# Patient Record
Sex: MALE | Employment: FULL TIME | URBAN - METROPOLITAN AREA
[De-identification: names, ages, dates, MRNs, and addresses within clinical notes are randomized per-mention and may not be internally consistent; named-entity substitution may affect disease eponyms.]

---

## 2018-09-24 ENCOUNTER — PRE VISIT (OUTPATIENT)
Dept: UROLOGY | Facility: CLINIC | Age: 38
End: 2018-09-24

## 2018-09-24 NOTE — TELEPHONE ENCOUNTER
MEDICAL RECORDS REQUEST   [facility]  Urology Clinic  [address] [place] [address]  PHONE: [phone]  Fax: [phone]        FUTURE VISIT INFORMATION                                                   Shahab Whatley, : 1980 scheduled for future visit at [facility]    APPOINTMENT INFORMATION:    Date: 2018    Provider:  Jama Goins    Reason for Visit/Diagnosis: Consult for Vasectomy    REFERRAL INFORMATION:    Referring provider:  self    Specialty: self    Referring providers clinic:  self    Clinic contact number:  self    RECORDS REQUESTED FOR VISIT                                                     NOTES  STATUS/DETAILS   OFFICE NOTE from referring provider  no   OFFICE NOTE from other specialist  no   DISCHARGE SUMMARY from hospital  no   DISCHARGE REPORT from the ER  no   OPERATIVE REPORT  no   MEDICATION LIST  no       PRE-VISIT CHECKLIST      Record collection complete Yes   Appointment appropriately scheduled           (right time/right provider) Yes   MyChart activation If no, please explain in process   Questionnaire complete If no, please explain in process     Completed by: Deisy Barron

## 2018-10-31 ENCOUNTER — PRE VISIT (OUTPATIENT)
Dept: UROLOGY | Facility: CLINIC | Age: 38
End: 2018-10-31

## 2018-10-31 NOTE — TELEPHONE ENCOUNTER
Patient coming in for vasectomy consult with Dr. Goins. Patient chart reviewed, no need for call, all records available and ready for appointment.

## 2018-11-08 ENCOUNTER — OFFICE VISIT (OUTPATIENT)
Dept: UROLOGY | Facility: CLINIC | Age: 38
End: 2018-11-08
Payer: COMMERCIAL

## 2018-11-08 VITALS
BODY MASS INDEX: 29.96 KG/M2 | HEART RATE: 50 BPM | DIASTOLIC BLOOD PRESSURE: 79 MMHG | HEIGHT: 71 IN | SYSTOLIC BLOOD PRESSURE: 122 MMHG | WEIGHT: 214 LBS

## 2018-11-08 DIAGNOSIS — Z30.09 ENCOUNTER FOR VASECTOMY COUNSELING: Primary | ICD-10-CM

## 2018-11-08 ASSESSMENT — PAIN SCALES - GENERAL: PAINLEVEL: NO PAIN (0)

## 2018-11-08 NOTE — PROGRESS NOTES
"CC: Desires sterilization, consult for vasectomy.    HPI: Shahab Whatley is a 38 year old male with 3 children, and he is intersted in getting a vasectomy for sterilization.      No voiding problems.  No history of  trauma.  No hematuria  Normal sexual function.  No history of bleeding problems.    On evidence of blood per urethra 13 years ago.    PMH:   No chronic medical problems   No history of surgery.     FAMILY HX:   History reviewed. No pertinent family history.   No history of coagulopathies, no  malignancies.     ALLERGIES:    No Known Allergies    MEDS:   No prescription medications     SOCIAL HISTORY:  Social History   Substance Use Topics     Smoking status: Never Smoker     Smokeless tobacco: Never Used     Alcohol use Not on file        REVIEW OF SYMPTOMS:   Denies testicular pain, ED, ejaculatory problems, rashes in the groin, easy bruising or bleeding.   No constitutional, eye, ENT, heart, lung, GI, musculoskeletal, skin, neurologic, psychiatric, endocrine or hematologic complaints.   Answers for HPI/ROS submitted by the patient on 11/8/2018   General Symptoms: No  Skin Symptoms: No  HENT Symptoms: No  EYE SYMPTOMS: No  HEART SYMPTOMS: No  LUNG SYMPTOMS: No  INTESTINAL SYMPTOMS: No  URINARY SYMPTOMS: No  REPRODUCTIVE SYMPTOMS: No  SKELETAL SYMPTOMS: No  BLOOD SYMPTOMS: No  NERVOUS SYSTEM SYMPTOMS: No  MENTAL HEALTH SYMPTOMS: No  PHQ-2 Score: 0      GENERAL PHYSICAL EXAM:   /79  Pulse 50  Ht 1.803 m (5' 11\")  Wt 97.1 kg (214 lb)  BMI 29.85 kg/m2     Constitutional: No acute distress. Well nourished.   PSYCH: Normal mood and affect.   NEURO: Normal gait, no focal deficits.   EYES: Anicteric, EOMI, PERR  CARDIOPULMONARY: Breathing non-labored, pulse regular, no peripheral edema.   GI: Abdomen soft, non-tender, surgical scars: none,  no organomegaly.  MUSCULOSKELETAL: Normal limb proportions, no muscle wasting, no contractures.    SKIN: Normal virilized hair distribution, no lesions, warts " or rashes over genitalia, abdomen extremities or face.   HEME/LYMPH: No echymosis, no lymphadenopathy in groin, no lymphedema.     EXAM:  Phallus  circumcised, meatus adequate, no plaques palpated.   Testes descended, consistency is normal. No intra-testicular masses.  Epididymes present.  Vas present bilateraly, both easy to find.  YASIR deferred.      I discussed with him at length the risks and benefits of the procedure. He understands that this is a sterilization procedure, and not reversible contraception. He understands that reversals, while possible, are not guaranteed to work and fairly complex. I discussed with him the option of sperm cryopreservation.     I stressed that he continues to be fertile in the post-operative period, and that he should continue using other contraceptive methods, such as a condom, until he obtains a semen analysis and we review the results to confirm success of the procedure and infertility. I also stressed to him that recanalization and pregnancy can occur in about 1 per thousand cases, possibly more even after we clear him with a semen analysis showing no motile sperm. I counseled him on the mica-operative risks of bleeding, infection, pain.  I described to him post-vasectomy pain syndrome that can occur in about 1 to 2% of men undergoing vasectomy.     We also discussed recovery times (typically days if no complications) and post-operative care including use of ice packs, pain medication and wound care.    He will think about the above and schedule the procedure if he decides to proceed.

## 2018-11-08 NOTE — MR AVS SNAPSHOT
After Visit Summary   2018    Shahab Whatley    MRN: [id number]           Patient Information     Date Of Birth          1980        Visit Information        Provider Department      2018 9:20 AM Jama Goins MD [facility]        Today's Diagnoses     Encounter for vasectomy counseling    -  1      Care Instructions    Schedule vasectomy with Dr. Goins.    Please complete prep.      It was a pleasure meeting with you today.  Thank you for allowing me and my team the privilege of caring for you today.  YOU are the reason we are here, and I truly hope we provided you with the excellent service you deserve.  Please let us know if there is anything else we can do for you so that we can be sure you are leaving completely satisfied with your care experience.                  Follow-ups after your visit        Follow-up notes from your care team     Return if symptoms worsen or fail to improve.      Who to contact     Please call your clinic at [phone] to:    Ask questions about your health    Make or cancel appointments    Discuss your medicines    Learn about your test results    Speak to your doctor            Additional Information About Your Visit        My[employer] Information     [employer] Forward is an electronic gateway that provides easy, online access to your medical records. With Money Forward, you can request a clinic appointment, read your test results, renew a prescription or communicate with your care team.     To sign up for [employer] Forward visit the website at [email or web address]   You will be asked to enter the access code listed below, as well as some personal information. Please follow the directions to create your username and password.     Your access code is: [email or web address]  Expires: 2019  5:31 AM     Your access code will  in 90 days. If you need help or a new code, please contact your [facility]  "Clinic or call [phone] for assistance.        Care EveryWhere ID     This is your Care EveryWhere ID. This could be used by other organizations to access your [facility] medical records  [id number]        Your Vitals Were     Pulse Height BMI (Body Mass Index)             50 1.803 m (5' 11\") 29.85 kg/m2          Blood Pressure from Last 3 Encounters:   11/08/18 122/79    Weight from Last 3 Encounters:   11/08/18 97.1 kg (214 lb)              Today, you had the following     No orders found for display       Primary Care Provider Fax #    Provider Not In System [phone]                Equal Access to Services     [facility]: Hadii leslie luo Sotiny, waaxda luuriahadaha, qaybta kaalmada laiyada, amanda vang . [facility] [phone].    ATENCIÓN: Si habla español, tiene a burgos disposición servicios gratuitos de asistencia lingüística. Llame al [phone].    We comply with applicable federal civil rights laws and [place] laws. We do not discriminate on the basis of race, color, national origin, age, disability, sex, sexual orientation, or gender identity.            Thank you!     Thank you for choosing [facility] UROLOGY AND [facility] UROLOGIC [facility]CERS  for your care. Our goal is always to provide you with excellent care. Hearing back from our patients is one way we can continue to improve our services. Please take a few minutes to complete the written survey that you may receive in the mail after your visit with us. Thank you!             Your Updated Medication List - Protect others around you: Learn how to safely use, store and throw away your medicines at www.disposemymeds.org.      Notice  As of 11/8/2018  9:35 AM    You have not been prescribed any medications.      "

## 2018-11-08 NOTE — PATIENT INSTRUCTIONS
Schedule vasectomy with Dr. Goins.    Please complete prep.      It was a pleasure meeting with you today.  Thank you for allowing me and my team the privilege of caring for you today.  YOU are the reason we are here, and I truly hope we provided you with the excellent service you deserve.  Please let us know if there is anything else we can do for you so that we can be sure you are leaving completely satisfied with your care experience.

## 2018-11-08 NOTE — NURSING NOTE
"Chief Complaint   Patient presents with     Consult     vasectomy discussion       Blood pressure 122/79, pulse 50, height 1.803 m (5' 11\"), weight 97.1 kg (214 lb). Body mass index is 29.85 kg/(m^2).    There is no problem list on file for this patient.      No Known Allergies    No current outpatient prescriptions on file.       Social History   Substance Use Topics     Smoking status: Never Smoker     Smokeless tobacco: Never Used     Alcohol use Not on file       Saumya Ross LPN  11/8/2018  9:16 AM    "

## 2018-11-08 NOTE — LETTER
"11/8/2018       RE: Shahab Whatley  [address]  [place] [facility] [place] [facility] [address]     Dear Colleague,    Thank you for referring your patient, Shahab Whatley, to the [facility] UROLOGY AND [facility]ST FOR PROST[facility] AND UROLOGIC CANCERS at [facility]. Please see a copy of my visit note below.    CC: Desires sterilization, consult for vasectomy.    HPI: Shahab Whatley is a 38 year old male with 3 children, and he is intersted in getting a vasectomy for sterilization.      No voiding problems.  No history of  trauma.  No hematuria  Normal sexual function.  No history of bleeding problems.    On evidence of blood per urethra 13 years ago.    PMH:   No chronic medical problems   No history of surgery.     FAMILY HX:   History reviewed. No pertinent family history.   No history of coagulopathies, no  malignancies.     ALLERGIES:    No Known Allergies    MEDS:   No prescription medications     SOCIAL HISTORY:  Social History   Substance Use Topics     Smoking status: Never Smoker     Smokeless tobacco: Never Used     Alcohol use Not on file        REVIEW OF SYMPTOMS:   Denies testicular pain, ED, ejaculatory problems, rashes in the groin, easy bruising or bleeding.   No constitutional, eye, ENT, heart, lung, GI, musculoskeletal, skin, neurologic, psychiatric, endocrine or hematologic complaints.  GENERAL PHYSICAL EXAM:   /79  Pulse 50  Ht 1.803 m (5' 11\")  Wt 97.1 kg (214 lb)  BMI 29.85 kg/m2     Constitutional: No acute distress. Well nourished.   PSYCH: Normal mood and affect.   NEURO: Normal gait, no focal deficits.   EYES: Anicteric, EOMI, PERR  CARDIOPULMONARY: Breathing non-labored, pulse regular, no peripheral edema.   GI: Abdomen soft, non-tender, surgical scars: none,  no organomegaly.  MUSCULOSKELETAL: Normal limb proportions, no muscle wasting, no contractures.    SKIN: Normal virilized hair distribution, no lesions, warts or rashes over genitalia, abdomen extremities or face. "   HEME/LYMPH: No echymosis, no lymphadenopathy in groin, no lymphedema.     EXAM:  Phallus  circumcised, meatus adequate, no plaques palpated.   Testes descended, consistency is normal. No intra-testicular masses.  Epididymes present.  Vas present bilateraly, both easy to find.  YASIR deferred.      I discussed with him at length the risks and benefits of the procedure. He understands that this is a sterilization procedure, and not reversible contraception. He understands that reversals, while possible, are not guaranteed to work and fairly complex. I discussed with him the option of sperm cryopreservation.     I stressed that he continues to be fertile in the post-operative period, and that he should continue using other contraceptive methods, such as a condom, until he obtains a semen analysis and we review the results to confirm success of the procedure and infertility. I also stressed to him that recanalization and pregnancy can occur in about 1 per thousand cases, possibly more even after we clear him with a semen analysis showing no motile sperm. I counseled him on the mica-operative risks of bleeding, infection, pain.  I described to him post-vasectomy pain syndrome that can occur in about 1 to 2% of men undergoing vasectomy.     We also discussed recovery times (typically days if no complications) and post-operative care including use of ice packs, pain medication and wound care.    He will think about the above and schedule the procedure if he decides to proceed.    Again, thank you for allowing me to participate in the care of your patient.      Sincerely,    Jama Goins MD

## 2018-11-15 ENCOUNTER — TELEPHONE (OUTPATIENT)
Dept: UROLOGY | Facility: CLINIC | Age: 38
End: 2018-11-15

## 2018-11-15 NOTE — TELEPHONE ENCOUNTER
[facility] Call Center    Phone Message    May a detailed message be left on voicemail: no    Reason for Call: Other: Pt already had vasectomy consult with Dr Goins.  He is asking if there is any appt for him to get vasectomy done yet in 2018.  There are no vasectomy appts on schedule.  Please call patient back.      Action Taken: Message routed to:  Adult Clinics: Urology p [phone]

## 2018-11-15 NOTE — TELEPHONE ENCOUNTER
Called and spoke to patient. Vasectomy with Dr. Goins scheduled for 12/17/18 at 3:00pm at [facility]. Patient will call with any questions in the meantime.    Juju Perales RN, BSN

## 2018-12-17 ENCOUNTER — OFFICE VISIT (OUTPATIENT)
Dept: UROLOGY | Facility: CLINIC | Age: 38
End: 2018-12-17
Payer: COMMERCIAL

## 2018-12-17 DIAGNOSIS — Z30.2 ENCOUNTER FOR VASECTOMY: Primary | ICD-10-CM

## 2018-12-17 PROCEDURE — 55250 REMOVAL OF SPERM DUCT(S): CPT | Performed by: UROLOGY

## 2018-12-17 NOTE — PATIENT INSTRUCTIONS
Vasectomy Post-Op Care Instructions     You may go home after the procedure is completed. There may be some pain in your groin for 3 or 4 days after the operation. Some blood or yellow liquid may ooze from the cuts on the outside. The area around the cuts may swell and bruise. The sutures will dissolve on their own and do not require removal by the physician.    The first 48 hours after the procedure are crucial to healing. Generally, you may feel very good the day after the procedure, but that does not mean it is time to go back to normal activities. Resuming normal activities too soon is likely to cause internal bleeding and lots of pain.      Your provider may advise the following ways to care for yourself after the procedure:    Put an ice bag or package of frozen peas covered by a thin towel over the scrotum after the procedure. Leave the ice on the area for 30 minutes and then take it off for 30 minutes. Do this off and on for at least 24 hours.      Avoid all heavy lifting (greater than 10 pounds) for at least one week.    Wear a jockstrap or tight-fitting underwear for approximately one week to support the scrotum (testicles) and help reduce discomfort.    Take a pain reliever, such as Acetaminophen (Tylenol) or Ibuprofen (Advil), for any pain after the procedure. Your provider may prescribe a stronger pain medicine if it is needed.    You should be able to return to work in 48 hours, but strenuous activity should be avoided for two weeks.    Do not submerge the incision for 1 week following the procedure.    Ejaculation should be avoided for one week to allow the area to heal.    Follow-Up    You will need to have a negative post vasectomy analyses (no sperm seen) before you can discontinue birth control.    Semen Analysis   Schedule the post-vasectomy semen analysis three months after your vasectomy. You will need to ejaculate at least 20 times between your surgery and the first sample. Clinic staff will  contact your regarding your results via phone.    You will be given a container after the procedure. Collect the specimen at home by masturbation only (no lubrication, powders, saliva, or intercourse can be used) and bring it to the laboratory. You must have an appointment to drop off your specimen. The specimen needs to be delivered to the lab within 30-45 minutes.    Call [phone] with questions. For concerns or questions after hours or on weekends, please page the Urology Resident on-call: [phone].

## 2019-07-12 DIAGNOSIS — Z30.2 ENCOUNTER FOR VASECTOMY: ICD-10-CM

## 2019-07-12 LAB — SPERM P VAS SMN QL MICRO: NORMAL

## 2019-07-12 PROCEDURE — 89321 SEMEN ANAL SPERM DETECTION: CPT | Performed by: UROLOGY

## 2019-07-12 NOTE — RESULT ENCOUNTER NOTE
Dear Shahab,     You are cleared for intercourse post-vasectomy.    Your semen analysis showed zero sperm.    Thank You!   Let me know if you have any questions.    Rosangela CARRERA

## 2021-03-22 ENCOUNTER — MEDICAL CORRESPONDENCE (OUTPATIENT)
Dept: HEALTH INFORMATION MANAGEMENT | Facility: CLINIC | Age: 41
End: 2021-03-22

## 2021-04-10 ENCOUNTER — HEALTH MAINTENANCE LETTER (OUTPATIENT)
Age: 41
End: 2021-04-10

## 2021-05-10 ENCOUNTER — HOSPITAL ENCOUNTER (OUTPATIENT)
Dept: CARDIOLOGY | Facility: CLINIC | Age: 41
Discharge: HOME | End: 2021-05-10
Attending: FAMILY MEDICINE | Admitting: FAMILY MEDICINE
Payer: COMMERCIAL

## 2021-05-10 DIAGNOSIS — E78.6 HIGH DENSITY LIPOPROTEIN (HDL) LESS THAN 40 MG/DL: ICD-10-CM

## 2021-05-10 DIAGNOSIS — E78.00 HIGH CHOLESTEROL: ICD-10-CM

## 2021-05-10 PROCEDURE — 75571 CT HRT W/O DYE W/CA TEST: CPT | Mod: GA

## 2021-05-10 PROCEDURE — 75571 CT HRT W/O DYE W/CA TEST: CPT | Mod: 26 | Performed by: INTERNAL MEDICINE

## 2021-09-25 ENCOUNTER — HEALTH MAINTENANCE LETTER (OUTPATIENT)
Age: 41
End: 2021-09-25

## 2022-05-07 ENCOUNTER — HEALTH MAINTENANCE LETTER (OUTPATIENT)
Age: 42
End: 2022-05-07

## 2022-12-26 ENCOUNTER — HEALTH MAINTENANCE LETTER (OUTPATIENT)
Age: 42
End: 2022-12-26

## 2023-06-02 ENCOUNTER — HEALTH MAINTENANCE LETTER (OUTPATIENT)
Age: 43
End: 2023-06-02

## 2025-05-06 NOTE — PROGRESS NOTES
Informed Consent for Blood Component Transfusion Note    I have discussed with the patient the rationale for blood component transfusion; its benefits in treating or preventing fatigue, organ damage, or death; and its risk which includes mild transfusion reactions, rare risk of blood borne infection, or more serious but rare reactions. I have discussed the alternatives to transfusion, including the risk and consequences of not receiving transfusion. The patient had an opportunity to ask questions and had agreed to proceed with transfusion of blood components.    Electronically signed by SAFIA Bennett NP on 5/6/25 at 3:02 AM EDT   Procedure: Vasectomy bilateral.   Anesthesia: Local lidocaine injection by surgeon.  Surgeon: JOSE Goins M.D.   Assistant:  JOSE Keller present    Description of procedure: After the patient received education material regarding vasectomy, including risks and benefits, we proceeded with obtaining consent and proceeded with the surgery. He understands that there is a risk of late failure from recanalization. He understands that he is fertile until he has completed one or more semen analyses and he is given clearance from us for unprotected intercourse.  He understands that we will contact regarding the results but it is his responsibility to make sure he is cleared before having unprotected intercourse.  I have discussed with him other risks including bleeding, infection, acute and possible long-term pain.    The right vas was ligated first.  This was done using the standard technique by grasping it with a three-finger  and lifting it up to the skin.  A small amount of lidocaine was infiltrated into the skin and vasal sheath.  The skin was punctured and dilated bluntly using the scalpel-less dissector.  The sheath was identified and a rigid clamp was passed around the vas which was then lifted out of the incision.  The vas was cleaned off from the deferential vessels and the sheath, and cautery was used to divide the vas between mosquitos.  A small segment was removed and discarded.  The lumen of the vas was cannulated to confirm its identity, and the lumens of both ends were cauterized.  Pen cautery was used sparingly/as needed for minor bleeders.  A facial interposition was then performed with a single suture of 4-0 Monocryl. The skin defect was closed with a 4-0 chromic interrupted suture after confirming adequate hemostasis.       We then turned our attention to the left side and a similar technique was performed. This involved division, excision of a segment, cautery of the lumens, and fascial  interposition.    There were no hematomas noted at the end of the procedure.  The patient tolerated the procedure well.    He was advised to return for a post vasectomy semen check in 3 months, and that he is not sterile and must continue to use contraception until we tell him otherwise (based on follow-up semen specimen(s)).    Plan:  -Post vasectomy semen analysis in 3 months   -ice packs to scrotum X 24h  -keep incision dry X 48h  -Rx for  Tylenol #3